# Patient Record
Sex: FEMALE | Race: BLACK OR AFRICAN AMERICAN | NOT HISPANIC OR LATINO | Employment: UNEMPLOYED | URBAN - METROPOLITAN AREA
[De-identification: names, ages, dates, MRNs, and addresses within clinical notes are randomized per-mention and may not be internally consistent; named-entity substitution may affect disease eponyms.]

---

## 2023-12-31 ENCOUNTER — APPOINTMENT (OUTPATIENT)
Dept: RADIOLOGY | Facility: HOSPITAL | Age: 43
End: 2023-12-31
Payer: COMMERCIAL

## 2023-12-31 ENCOUNTER — HOSPITAL ENCOUNTER (OUTPATIENT)
Facility: HOSPITAL | Age: 43
Setting detail: OBSERVATION
Discharge: HOME | End: 2024-01-01
Attending: EMERGENCY MEDICINE | Admitting: INTERNAL MEDICINE
Payer: COMMERCIAL

## 2023-12-31 ENCOUNTER — APPOINTMENT (OUTPATIENT)
Dept: CARDIOLOGY | Facility: HOSPITAL | Age: 43
End: 2023-12-31
Payer: COMMERCIAL

## 2023-12-31 DIAGNOSIS — E86.0 DEHYDRATION: ICD-10-CM

## 2023-12-31 DIAGNOSIS — E11.00 TYPE 2 DIABETES MELLITUS WITH HYPEROSMOLAR NONKETOTIC HYPERGLYCEMIA (MULTI): Primary | ICD-10-CM

## 2023-12-31 LAB
ALBUMIN SERPL BCP-MCNC: 3.7 G/DL (ref 3.4–5)
ALP SERPL-CCNC: 94 U/L (ref 33–110)
ALT SERPL W P-5'-P-CCNC: 10 U/L (ref 7–45)
ANION GAP BLDV CALCULATED.4IONS-SCNC: 8 MMOL/L (ref 10–25)
ANION GAP SERPL CALC-SCNC: 15 MMOL/L (ref 10–20)
AST SERPL W P-5'-P-CCNC: 11 U/L (ref 9–39)
B-HCG SERPL-ACNC: <2 MIU/ML
BASE EXCESS BLDV CALC-SCNC: 4.8 MMOL/L (ref -2–3)
BASOPHILS # BLD AUTO: 0.03 X10*3/UL (ref 0–0.1)
BASOPHILS NFR BLD AUTO: 0.3 %
BILIRUB SERPL-MCNC: 0.4 MG/DL (ref 0–1.2)
BNP SERPL-MCNC: 45 PG/ML (ref 0–99)
BODY TEMPERATURE: 37 DEGREES CELSIUS
BUN SERPL-MCNC: 12 MG/DL (ref 6–23)
CA-I BLDV-SCNC: 1.21 MMOL/L (ref 1.1–1.33)
CALCIUM SERPL-MCNC: 9.2 MG/DL (ref 8.6–10.3)
CARDIAC TROPONIN I PNL SERPL HS: 15 NG/L (ref 0–13)
CHLORIDE BLDV-SCNC: 94 MMOL/L (ref 98–107)
CHLORIDE SERPL-SCNC: 92 MMOL/L (ref 98–107)
CO2 SERPL-SCNC: 26 MMOL/L (ref 21–32)
CREAT SERPL-MCNC: 0.81 MG/DL (ref 0.5–1.05)
D DIMER PPP FEU-MCNC: 224 NG/ML FEU
EOSINOPHIL # BLD AUTO: 0.09 X10*3/UL (ref 0–0.7)
EOSINOPHIL NFR BLD AUTO: 1 %
ERYTHROCYTE [DISTWIDTH] IN BLOOD BY AUTOMATED COUNT: 13.7 % (ref 11.5–14.5)
EST. AVERAGE GLUCOSE BLD GHB EST-MCNC: 364 MG/DL
ETHANOL SERPL-MCNC: <10 MG/DL
FLUAV RNA RESP QL NAA+PROBE: NOT DETECTED
FLUBV RNA RESP QL NAA+PROBE: NOT DETECTED
GFR SERPL CREATININE-BSD FRML MDRD: >90 ML/MIN/1.73M*2
GLUCOSE BLD MANUAL STRIP-MCNC: 261 MG/DL (ref 74–99)
GLUCOSE BLD MANUAL STRIP-MCNC: 285 MG/DL (ref 74–99)
GLUCOSE BLD MANUAL STRIP-MCNC: 306 MG/DL (ref 74–99)
GLUCOSE BLDV-MCNC: 554 MG/DL (ref 74–99)
GLUCOSE SERPL-MCNC: 485 MG/DL (ref 74–99)
HBA1C MFR BLD: 14.3 %
HCO3 BLDV-SCNC: 29.9 MMOL/L (ref 22–26)
HCT VFR BLD AUTO: 42.1 % (ref 36–46)
HCT VFR BLD EST: 38 % (ref 36–46)
HGB BLD-MCNC: 13 G/DL (ref 12–16)
HGB BLDV-MCNC: 12.7 G/DL (ref 12–16)
IMM GRANULOCYTES # BLD AUTO: 0.02 X10*3/UL (ref 0–0.7)
IMM GRANULOCYTES NFR BLD AUTO: 0.2 % (ref 0–0.9)
INHALED O2 CONCENTRATION: 21 %
LACTATE BLDV-SCNC: 3.2 MMOL/L (ref 0.4–2)
LACTATE BLDV-SCNC: 3.6 MMOL/L (ref 0.4–2)
LYMPHOCYTES # BLD AUTO: 3.32 X10*3/UL (ref 1.2–4.8)
LYMPHOCYTES NFR BLD AUTO: 38.6 %
MCH RBC QN AUTO: 23 PG (ref 26–34)
MCHC RBC AUTO-ENTMCNC: 30.9 G/DL (ref 32–36)
MCV RBC AUTO: 75 FL (ref 80–100)
MONOCYTES # BLD AUTO: 0.43 X10*3/UL (ref 0.1–1)
MONOCYTES NFR BLD AUTO: 5 %
NEUTROPHILS # BLD AUTO: 4.71 X10*3/UL (ref 1.2–7.7)
NEUTROPHILS NFR BLD AUTO: 54.9 %
NRBC BLD-RTO: 0 /100 WBCS (ref 0–0)
OSMOLALITY SERPL: 302 MOSM/KG (ref 280–300)
OXYHGB MFR BLDV: 87 % (ref 45–75)
PCO2 BLDV: 45 MM HG (ref 41–51)
PH BLDV: 7.43 PH (ref 7.33–7.43)
PLATELET # BLD AUTO: 236 X10*3/UL (ref 150–450)
PO2 BLDV: 58 MM HG (ref 35–45)
POTASSIUM BLDV-SCNC: 4.4 MMOL/L (ref 3.5–5.3)
POTASSIUM SERPL-SCNC: 4.5 MMOL/L (ref 3.5–5.3)
PROT SERPL-MCNC: 7 G/DL (ref 6.4–8.2)
RBC # BLD AUTO: 5.64 X10*6/UL (ref 4–5.2)
SAO2 % BLDV: 89 % (ref 45–75)
SARS-COV-2 RNA RESP QL NAA+PROBE: NOT DETECTED
SODIUM BLDV-SCNC: 127 MMOL/L (ref 136–145)
SODIUM SERPL-SCNC: 128 MMOL/L (ref 136–145)
WBC # BLD AUTO: 8.6 X10*3/UL (ref 4.4–11.3)

## 2023-12-31 PROCEDURE — 2500000004 HC RX 250 GENERAL PHARMACY W/ HCPCS (ALT 636 FOR OP/ED): Performed by: EMERGENCY MEDICINE

## 2023-12-31 PROCEDURE — 84484 ASSAY OF TROPONIN QUANT: CPT | Performed by: EMERGENCY MEDICINE

## 2023-12-31 PROCEDURE — 83880 ASSAY OF NATRIURETIC PEPTIDE: CPT | Performed by: EMERGENCY MEDICINE

## 2023-12-31 PROCEDURE — 84132 ASSAY OF SERUM POTASSIUM: CPT | Performed by: EMERGENCY MEDICINE

## 2023-12-31 PROCEDURE — 85379 FIBRIN DEGRADATION QUANT: CPT | Performed by: EMERGENCY MEDICINE

## 2023-12-31 PROCEDURE — 83036 HEMOGLOBIN GLYCOSYLATED A1C: CPT | Mod: AHULAB | Performed by: NURSE PRACTITIONER

## 2023-12-31 PROCEDURE — 87636 SARSCOV2 & INF A&B AMP PRB: CPT | Performed by: EMERGENCY MEDICINE

## 2023-12-31 PROCEDURE — 36415 COLL VENOUS BLD VENIPUNCTURE: CPT | Performed by: EMERGENCY MEDICINE

## 2023-12-31 PROCEDURE — 85025 COMPLETE CBC W/AUTO DIFF WBC: CPT | Performed by: EMERGENCY MEDICINE

## 2023-12-31 PROCEDURE — 99222 1ST HOSP IP/OBS MODERATE 55: CPT | Performed by: NURSE PRACTITIONER

## 2023-12-31 PROCEDURE — 84702 CHORIONIC GONADOTROPIN TEST: CPT | Performed by: EMERGENCY MEDICINE

## 2023-12-31 PROCEDURE — 96375 TX/PRO/DX INJ NEW DRUG ADDON: CPT

## 2023-12-31 PROCEDURE — 2500000001 HC RX 250 WO HCPCS SELF ADMINISTERED DRUGS (ALT 637 FOR MEDICARE OP): Performed by: NURSE PRACTITIONER

## 2023-12-31 PROCEDURE — G0378 HOSPITAL OBSERVATION PER HR: HCPCS

## 2023-12-31 PROCEDURE — 2500000001 HC RX 250 WO HCPCS SELF ADMINISTERED DRUGS (ALT 637 FOR MEDICARE OP): Performed by: EMERGENCY MEDICINE

## 2023-12-31 PROCEDURE — 83930 ASSAY OF BLOOD OSMOLALITY: CPT | Mod: AHULAB | Performed by: NURSE PRACTITIONER

## 2023-12-31 PROCEDURE — 70450 CT HEAD/BRAIN W/O DYE: CPT | Performed by: STUDENT IN AN ORGANIZED HEALTH CARE EDUCATION/TRAINING PROGRAM

## 2023-12-31 PROCEDURE — 99285 EMERGENCY DEPT VISIT HI MDM: CPT | Mod: 25 | Performed by: EMERGENCY MEDICINE

## 2023-12-31 PROCEDURE — 99222 1ST HOSP IP/OBS MODERATE 55: CPT | Performed by: INTERNAL MEDICINE

## 2023-12-31 PROCEDURE — 96372 THER/PROPH/DIAG INJ SC/IM: CPT | Performed by: NURSE PRACTITIONER

## 2023-12-31 PROCEDURE — 93005 ELECTROCARDIOGRAM TRACING: CPT

## 2023-12-31 PROCEDURE — 2500000004 HC RX 250 GENERAL PHARMACY W/ HCPCS (ALT 636 FOR OP/ED): Performed by: NURSE PRACTITIONER

## 2023-12-31 PROCEDURE — 82947 ASSAY GLUCOSE BLOOD QUANT: CPT | Mod: 91

## 2023-12-31 PROCEDURE — 82077 ASSAY SPEC XCP UR&BREATH IA: CPT | Performed by: EMERGENCY MEDICINE

## 2023-12-31 PROCEDURE — 82330 ASSAY OF CALCIUM: CPT | Performed by: EMERGENCY MEDICINE

## 2023-12-31 PROCEDURE — 96361 HYDRATE IV INFUSION ADD-ON: CPT

## 2023-12-31 PROCEDURE — 2500000002 HC RX 250 W HCPCS SELF ADMINISTERED DRUGS (ALT 637 FOR MEDICARE OP, ALT 636 FOR OP/ED): Performed by: NURSE PRACTITIONER

## 2023-12-31 PROCEDURE — 83605 ASSAY OF LACTIC ACID: CPT | Mod: 91 | Performed by: EMERGENCY MEDICINE

## 2023-12-31 PROCEDURE — 80053 COMPREHEN METABOLIC PANEL: CPT | Performed by: EMERGENCY MEDICINE

## 2023-12-31 PROCEDURE — 96374 THER/PROPH/DIAG INJ IV PUSH: CPT

## 2023-12-31 PROCEDURE — 70450 CT HEAD/BRAIN W/O DYE: CPT

## 2023-12-31 PROCEDURE — 2500000002 HC RX 250 W HCPCS SELF ADMINISTERED DRUGS (ALT 637 FOR MEDICARE OP, ALT 636 FOR OP/ED): Performed by: EMERGENCY MEDICINE

## 2023-12-31 RX ORDER — ATORVASTATIN CALCIUM 80 MG/1
80 TABLET, FILM COATED ORAL DAILY
COMMUNITY

## 2023-12-31 RX ORDER — SPIRONOLACTONE 25 MG/1
25 TABLET ORAL DAILY
COMMUNITY

## 2023-12-31 RX ORDER — ATORVASTATIN CALCIUM 80 MG/1
80 TABLET, FILM COATED ORAL NIGHTLY
Status: DISCONTINUED | OUTPATIENT
Start: 2023-12-31 | End: 2024-01-01 | Stop reason: HOSPADM

## 2023-12-31 RX ORDER — CARVEDILOL 25 MG/1
25 TABLET ORAL
Status: DISCONTINUED | OUTPATIENT
Start: 2023-12-31 | End: 2024-01-01 | Stop reason: HOSPADM

## 2023-12-31 RX ORDER — TORSEMIDE 20 MG/1
40 TABLET ORAL DAILY
Status: DISCONTINUED | OUTPATIENT
Start: 2023-12-31 | End: 2024-01-01 | Stop reason: HOSPADM

## 2023-12-31 RX ORDER — ASCORBIC ACID 500 MG
250 TABLET ORAL DAILY
COMMUNITY

## 2023-12-31 RX ORDER — ONDANSETRON HYDROCHLORIDE 2 MG/ML
4 INJECTION, SOLUTION INTRAVENOUS EVERY 8 HOURS PRN
Status: DISCONTINUED | OUTPATIENT
Start: 2023-12-31 | End: 2024-01-01 | Stop reason: HOSPADM

## 2023-12-31 RX ORDER — INSULIN LISPRO 100 [IU]/ML
0-10 INJECTION, SOLUTION INTRAVENOUS; SUBCUTANEOUS
Status: DISCONTINUED | OUTPATIENT
Start: 2023-12-31 | End: 2024-01-01 | Stop reason: HOSPADM

## 2023-12-31 RX ORDER — INSULIN GLARGINE 100 [IU]/ML
15 INJECTION, SOLUTION SUBCUTANEOUS NIGHTLY
Status: DISCONTINUED | OUTPATIENT
Start: 2023-12-31 | End: 2024-01-01 | Stop reason: HOSPADM

## 2023-12-31 RX ORDER — EZETIMIBE 10 MG/1
10 TABLET ORAL NIGHTLY
Status: DISCONTINUED | OUTPATIENT
Start: 2023-12-31 | End: 2024-01-01 | Stop reason: HOSPADM

## 2023-12-31 RX ORDER — TORSEMIDE 20 MG/1
40 TABLET ORAL DAILY
COMMUNITY

## 2023-12-31 RX ORDER — METOCLOPRAMIDE HYDROCHLORIDE 5 MG/ML
10 INJECTION INTRAMUSCULAR; INTRAVENOUS ONCE
Status: COMPLETED | OUTPATIENT
Start: 2023-12-31 | End: 2023-12-31

## 2023-12-31 RX ORDER — ONDANSETRON 4 MG/1
4 TABLET, FILM COATED ORAL EVERY 6 HOURS PRN
Status: DISCONTINUED | OUTPATIENT
Start: 2023-12-31 | End: 2023-12-31

## 2023-12-31 RX ORDER — FERROUS SULFATE 325(65) MG
65 TABLET, DELAYED RELEASE (ENTERIC COATED) ORAL
COMMUNITY

## 2023-12-31 RX ORDER — ACETAMINOPHEN 160 MG/5ML
650 SOLUTION ORAL EVERY 4 HOURS PRN
Status: DISCONTINUED | OUTPATIENT
Start: 2023-12-31 | End: 2024-01-01 | Stop reason: HOSPADM

## 2023-12-31 RX ORDER — GLIPIZIDE 5 MG/1
10 TABLET ORAL
Status: DISCONTINUED | OUTPATIENT
Start: 2023-12-31 | End: 2024-01-01 | Stop reason: HOSPADM

## 2023-12-31 RX ORDER — DEXTROSE 50 % IN WATER (D50W) INTRAVENOUS SYRINGE
25
Status: DISCONTINUED | OUTPATIENT
Start: 2023-12-31 | End: 2024-01-01 | Stop reason: HOSPADM

## 2023-12-31 RX ORDER — DEXTROSE MONOHYDRATE 100 MG/ML
0.3 INJECTION, SOLUTION INTRAVENOUS ONCE AS NEEDED
Status: DISCONTINUED | OUTPATIENT
Start: 2023-12-31 | End: 2024-01-01 | Stop reason: HOSPADM

## 2023-12-31 RX ORDER — CARVEDILOL 25 MG/1
25 TABLET ORAL
COMMUNITY

## 2023-12-31 RX ORDER — NAPROXEN SODIUM 220 MG/1
324 TABLET, FILM COATED ORAL ONCE
Status: COMPLETED | OUTPATIENT
Start: 2023-12-31 | End: 2023-12-31

## 2023-12-31 RX ORDER — INSULIN GLARGINE 100 [IU]/ML
15 INJECTION, SOLUTION SUBCUTANEOUS NIGHTLY
COMMUNITY

## 2023-12-31 RX ORDER — DIPHENHYDRAMINE HYDROCHLORIDE 50 MG/ML
50 INJECTION INTRAMUSCULAR; INTRAVENOUS ONCE
Status: COMPLETED | OUTPATIENT
Start: 2023-12-31 | End: 2023-12-31

## 2023-12-31 RX ORDER — ASCORBIC ACID 500 MG
250 TABLET ORAL DAILY
Status: DISCONTINUED | OUTPATIENT
Start: 2023-12-31 | End: 2024-01-01 | Stop reason: HOSPADM

## 2023-12-31 RX ORDER — EZETIMIBE 10 MG/1
10 TABLET ORAL DAILY
COMMUNITY

## 2023-12-31 RX ORDER — ENOXAPARIN SODIUM 100 MG/ML
40 INJECTION SUBCUTANEOUS EVERY 12 HOURS SCHEDULED
Status: DISCONTINUED | OUTPATIENT
Start: 2023-12-31 | End: 2024-01-01 | Stop reason: HOSPADM

## 2023-12-31 RX ORDER — SODIUM CHLORIDE 9 MG/ML
100 INJECTION, SOLUTION INTRAVENOUS CONTINUOUS
Status: DISCONTINUED | OUTPATIENT
Start: 2023-12-31 | End: 2023-12-31

## 2023-12-31 RX ORDER — ACETAMINOPHEN 325 MG/1
650 TABLET ORAL EVERY 4 HOURS PRN
Status: DISCONTINUED | OUTPATIENT
Start: 2023-12-31 | End: 2024-01-01 | Stop reason: HOSPADM

## 2023-12-31 RX ORDER — TALC
3 POWDER (GRAM) TOPICAL NIGHTLY
Status: DISCONTINUED | OUTPATIENT
Start: 2023-12-31 | End: 2024-01-01 | Stop reason: HOSPADM

## 2023-12-31 RX ORDER — ONDANSETRON 4 MG/1
4 TABLET, FILM COATED ORAL EVERY 6 HOURS PRN
COMMUNITY

## 2023-12-31 RX ORDER — SPIRONOLACTONE 25 MG/1
25 TABLET ORAL DAILY
Status: DISCONTINUED | OUTPATIENT
Start: 2023-12-31 | End: 2024-01-01 | Stop reason: HOSPADM

## 2023-12-31 RX ORDER — ALBUTEROL SULFATE 0.83 MG/ML
2.5 SOLUTION RESPIRATORY (INHALATION) EVERY 6 HOURS PRN
Status: DISCONTINUED | OUTPATIENT
Start: 2023-12-31 | End: 2024-01-01 | Stop reason: HOSPADM

## 2023-12-31 RX ORDER — ACETAMINOPHEN 650 MG/1
650 SUPPOSITORY RECTAL EVERY 4 HOURS PRN
Status: DISCONTINUED | OUTPATIENT
Start: 2023-12-31 | End: 2024-01-01 | Stop reason: HOSPADM

## 2023-12-31 RX ORDER — ONDANSETRON 4 MG/1
4 TABLET, FILM COATED ORAL EVERY 8 HOURS PRN
Status: DISCONTINUED | OUTPATIENT
Start: 2023-12-31 | End: 2024-01-01 | Stop reason: HOSPADM

## 2023-12-31 RX ORDER — FERROUS SULFATE 325(65) MG
65 TABLET ORAL DAILY
Status: DISCONTINUED | OUTPATIENT
Start: 2023-12-31 | End: 2024-01-01 | Stop reason: HOSPADM

## 2023-12-31 RX ORDER — POLYETHYLENE GLYCOL 3350 17 G/17G
17 POWDER, FOR SOLUTION ORAL DAILY
Status: DISCONTINUED | OUTPATIENT
Start: 2023-12-31 | End: 2024-01-01 | Stop reason: HOSPADM

## 2023-12-31 RX ORDER — GLIPIZIDE 10 MG/1
10 TABLET ORAL DAILY
COMMUNITY

## 2023-12-31 RX ORDER — SACUBITRIL AND VALSARTAN 49; 51 MG/1; MG/1
1 TABLET, FILM COATED ORAL 2 TIMES DAILY
COMMUNITY

## 2023-12-31 RX ADMIN — ATORVASTATIN CALCIUM 80 MG: 80 TABLET, FILM COATED ORAL at 21:03

## 2023-12-31 RX ADMIN — EZETIMIBE 10 MG: 10 TABLET ORAL at 21:03

## 2023-12-31 RX ADMIN — SODIUM CHLORIDE 1000 ML: 9 INJECTION, SOLUTION INTRAVENOUS at 14:20

## 2023-12-31 RX ADMIN — INSULIN LISPRO 6 UNITS: 100 INJECTION, SOLUTION INTRAVENOUS; SUBCUTANEOUS at 18:32

## 2023-12-31 RX ADMIN — FERROUS SULFATE TAB 325 MG (65 MG ELEMENTAL FE) 1 TABLET: 325 (65 FE) TAB at 18:34

## 2023-12-31 RX ADMIN — INSULIN GLARGINE 15 UNITS: 100 INJECTION, SOLUTION SUBCUTANEOUS at 21:11

## 2023-12-31 RX ADMIN — Medication 3 MG: at 21:03

## 2023-12-31 RX ADMIN — OXYCODONE HYDROCHLORIDE AND ACETAMINOPHEN 250 MG: 500 TABLET ORAL at 18:34

## 2023-12-31 RX ADMIN — ASPIRIN 81 MG CHEWABLE TABLET 324 MG: 81 TABLET CHEWABLE at 13:48

## 2023-12-31 RX ADMIN — INSULIN HUMAN 12 UNITS: 100 INJECTION, SOLUTION PARENTERAL at 15:08

## 2023-12-31 RX ADMIN — GLIPIZIDE 10 MG: 5 TABLET ORAL at 18:34

## 2023-12-31 RX ADMIN — ACETAMINOPHEN 650 MG: 325 TABLET ORAL at 21:03

## 2023-12-31 RX ADMIN — DIPHENHYDRAMINE HYDROCHLORIDE 50 MG: 50 INJECTION, SOLUTION INTRAMUSCULAR; INTRAVENOUS at 13:48

## 2023-12-31 RX ADMIN — ENOXAPARIN SODIUM 40 MG: 40 INJECTION SUBCUTANEOUS at 21:03

## 2023-12-31 RX ADMIN — METOCLOPRAMIDE 10 MG: 5 INJECTION, SOLUTION INTRAMUSCULAR; INTRAVENOUS at 13:48

## 2023-12-31 RX ADMIN — CARVEDILOL 25 MG: 25 TABLET, FILM COATED ORAL at 18:34

## 2023-12-31 RX ADMIN — SACUBITRIL AND VALSARTAN 1 TABLET: 49; 51 TABLET, FILM COATED ORAL at 21:03

## 2023-12-31 SDOH — SOCIAL STABILITY: SOCIAL INSECURITY: HAVE YOU HAD THOUGHTS OF HARMING ANYONE ELSE?: NO

## 2023-12-31 SDOH — SOCIAL STABILITY: SOCIAL INSECURITY: DOES ANYONE TRY TO KEEP YOU FROM HAVING/CONTACTING OTHER FRIENDS OR DOING THINGS OUTSIDE YOUR HOME?: NO

## 2023-12-31 SDOH — SOCIAL STABILITY: SOCIAL INSECURITY: HAS ANYONE EVER THREATENED TO HURT YOUR FAMILY OR YOUR PETS?: NO

## 2023-12-31 SDOH — SOCIAL STABILITY: SOCIAL INSECURITY: DO YOU FEEL ANYONE HAS EXPLOITED OR TAKEN ADVANTAGE OF YOU FINANCIALLY OR OF YOUR PERSONAL PROPERTY?: NO

## 2023-12-31 SDOH — SOCIAL STABILITY: SOCIAL INSECURITY: ABUSE: ADULT

## 2023-12-31 SDOH — SOCIAL STABILITY: SOCIAL INSECURITY: ARE THERE ANY APPARENT SIGNS OF INJURIES/BEHAVIORS THAT COULD BE RELATED TO ABUSE/NEGLECT?: NO

## 2023-12-31 SDOH — SOCIAL STABILITY: SOCIAL INSECURITY: DO YOU FEEL UNSAFE GOING BACK TO THE PLACE WHERE YOU ARE LIVING?: NO

## 2023-12-31 SDOH — SOCIAL STABILITY: SOCIAL INSECURITY: ARE YOU OR HAVE YOU BEEN THREATENED OR ABUSED PHYSICALLY, EMOTIONALLY, OR SEXUALLY BY ANYONE?: NO

## 2023-12-31 SDOH — SOCIAL STABILITY: SOCIAL INSECURITY: WERE YOU ABLE TO COMPLETE ALL THE BEHAVIORAL HEALTH SCREENINGS?: YES

## 2023-12-31 ASSESSMENT — PAIN SCALES - GENERAL
PAINLEVEL_OUTOF10: 6
PAINLEVEL_OUTOF10: 2
PAINLEVEL_OUTOF10: 3
PAINLEVEL_OUTOF10: 0 - NO PAIN

## 2023-12-31 ASSESSMENT — LIFESTYLE VARIABLES
SUBSTANCE_ABUSE_PAST_12_MONTHS: YES
HOW OFTEN DO YOU HAVE 6 OR MORE DRINKS ON ONE OCCASION: NEVER
HOW OFTEN DO YOU HAVE A DRINK CONTAINING ALCOHOL: NEVER
AUDIT-C TOTAL SCORE: 0
PRESCIPTION_ABUSE_PAST_12_MONTHS: NO
HOW MANY STANDARD DRINKS CONTAINING ALCOHOL DO YOU HAVE ON A TYPICAL DAY: PATIENT DOES NOT DRINK
AUDIT-C TOTAL SCORE: 0
SKIP TO QUESTIONS 9-10: 1

## 2023-12-31 ASSESSMENT — ACTIVITIES OF DAILY LIVING (ADL)
PATIENT'S MEMORY ADEQUATE TO SAFELY COMPLETE DAILY ACTIVITIES?: YES
HEARING - LEFT EAR: FUNCTIONAL
LACK_OF_TRANSPORTATION: NO
BATHING: INDEPENDENT
DRESSING YOURSELF: INDEPENDENT
TOILETING: INDEPENDENT
FEEDING YOURSELF: INDEPENDENT
HEARING - RIGHT EAR: FUNCTIONAL
GROOMING: INDEPENDENT
WALKS IN HOME: INDEPENDENT
JUDGMENT_ADEQUATE_SAFELY_COMPLETE_DAILY_ACTIVITIES: YES
ADEQUATE_TO_COMPLETE_ADL: YES
ASSISTIVE_DEVICE: EYEGLASSES

## 2023-12-31 ASSESSMENT — PATIENT HEALTH QUESTIONNAIRE - PHQ9
1. LITTLE INTEREST OR PLEASURE IN DOING THINGS: NOT AT ALL
2. FEELING DOWN, DEPRESSED OR HOPELESS: NOT AT ALL
SUM OF ALL RESPONSES TO PHQ9 QUESTIONS 1 & 2: 0

## 2023-12-31 ASSESSMENT — PAIN DESCRIPTION - DESCRIPTORS
DESCRIPTORS: PRESSURE
DESCRIPTORS: HEADACHE

## 2023-12-31 ASSESSMENT — PAIN - FUNCTIONAL ASSESSMENT
PAIN_FUNCTIONAL_ASSESSMENT: 0-10

## 2023-12-31 ASSESSMENT — COGNITIVE AND FUNCTIONAL STATUS - GENERAL
DAILY ACTIVITIY SCORE: 24
MOBILITY SCORE: 24
PATIENT BASELINE BEDBOUND: NO

## 2023-12-31 ASSESSMENT — COLUMBIA-SUICIDE SEVERITY RATING SCALE - C-SSRS
6. HAVE YOU EVER DONE ANYTHING, STARTED TO DO ANYTHING, OR PREPARED TO DO ANYTHING TO END YOUR LIFE?: NO
1. IN THE PAST MONTH, HAVE YOU WISHED YOU WERE DEAD OR WISHED YOU COULD GO TO SLEEP AND NOT WAKE UP?: NO
2. HAVE YOU ACTUALLY HAD ANY THOUGHTS OF KILLING YOURSELF?: NO

## 2023-12-31 ASSESSMENT — PAIN DESCRIPTION - LOCATION: LOCATION: CHEST

## 2023-12-31 NOTE — CONSULTS
"Inpatient consult to Cardiology  Consult performed by: Raz Guzmán DO  Consult ordered by: MIMA Jauregui-CNP  Reason for consult: NICM, chest pain with shock like feeling        History Of Present Illness:    Jose Abreu is a 43 y.o. female with nonischemic CM, s/p sub q ICD 2021(yetu), HTN, DM, HPL, morbid obesity admitted yesterday with exertional dyspnea and chest pain that described as shock like sensation.  This has been going on for the last 4 to 5 days.  Additional symptoms include palpitations/heart racing sensation, headaches, nausea without vomiting.  Found to be severely hyperglycemic on arrival with BS >500 and acidotic with lactate 3.2.  Seen at [facility] the day prior with similar complaints hyperglycemic \"blood sugar in the 400s.  She was given insulin and sent home.  Presented  for another opinion.    She reports compliance with her heart failure medications including Entresto, carvedilol, spironolactone.  Appears she could not tolerate SGLT2 inhibitors due to yeast infections.    This morning says she feels much better.  No further chest pain.  Nausea and headaches have also resolved.    Past Cardiology Tests   EK23, NSR with HR 99 bpm, LA enlargement, LVH with repolarization, no significant changes from ECG in     Echo:  22 ([facility]):  - The left ventricle is severely dilated. Left ventricular systolic function is   moderately decreased. EF = 40 ± 5% (2D biplane) Grade II left ventricular   diastolic dysfunction.   - The right ventricle is normal in size. Right ventricular systolic function is   normal.   - The left atrial cavity is mildly dilated.   - There is mild (1+) aortic valve regurgitation.   - Exam was compared with the prior  echocardiographic exam performed on   2021. LVEF has improved.     2021 severe LV systolic dysfunction, EF 22%. Normal RV systolic function. Mild mitral regurgitation. Mild to moderate tricuspid regurgitation. " "    2018: EF 40-45%, stage II DD, mild LVH    Cath:  Cardiac cath 10/2019 [facility] in [place] severely elevated LVEDP 42 mm Hg. EF 40%.       Past Medical History:  She has a past medical history of CHF (congestive heart failure) (CMS/[facility]), COPD (chronic obstructive pulmonary disease) (CMS/[facility]), and Diabetes mellitus, type 2 (CMS/[facility]).    Past Surgical History:  She has a past surgical history that includes Cardiac defibrillator placement.      Social History:  She reports that she has never smoked. She has never used smokeless tobacco. She reports current drug use. Drug: Marijuana. No history on file for alcohol use.    Family History:  No family history on file.     Allergies:  Lisinopril and Tomato    ROS:  10 point review of systems including (Constitutional, Eyes, ENMT, Respiratory, Cardiac, Gastrointestinal, Neurological, Psychiatric, and Hematologic) was performed and is otherwise negative.    Objective Data:  Last Recorded Vitals:  Vitals:    23 1246 23 1525 23 1713 23 1815   BP: 127/68 122/82 139/87 114/68   BP Location:    Right arm   Patient Position:    Lying   Pulse: 102 98 91 88   Resp: 22 16 16 16   Temp: 36.2 °C (97.1 °F)   35.9 °C (96.6 °F)   TempSrc: Temporal   Temporal   SpO2: 98% 99% 99% 99%   Weight: 120 kg (265 lb)   120 kg (265 lb)   Height: 1.626 m (5' 4\")   1.626 m (5' 4\")     Medical Gas Therapy: None (Room air)  Weight  Av kg (265 lb)  Min: 120 kg (265 lb)  Max: 120 kg (265 lb)      LABS:  CMP:  Results from last 7 days   Lab Units 23  1249   SODIUM mmol/L 128*   POTASSIUM mmol/L 4.5   CHLORIDE mmol/L 92*   CO2 mmol/L 26   ANION GAP mmol/L 15   BUN mg/dL 12   CREATININE mg/dL 0.81   EGFR mL/min/1.73m*2 >90   ALBUMIN g/dL 3.7   ALT U/L 10   AST U/L 11   BILIRUBIN TOTAL mg/dL 0.4     CBC:  Results from last 7 days   Lab Units 12/31/23  1249   WBC AUTO x10*3/uL 8.6   HEMOGLOBIN g/dL 13.0   HEMATOCRIT % 42.1   PLATELETS AUTO x10*3/uL 236   MCV " "fL 75*     COAG:     ABO: No results found for: \"ABO\"  HEME/ENDO:     CARDIAC:   Results from last 7 days   Lab Units 12/31/23  1341 12/31/23  1249   TROPHS ng/L  --  15*   BNP pg/mL 45  --            Component      Latest Ref Rng 12/31/2023   Hemoglobin A1C      see below % 14.3 (H)    Estimated Average Glucose      Not Established mg/dL 364       Legend:  (H) High    Last I/O:  No intake or output data in the 24 hours ending 12/31/23 1831  Net IO Since Admission: No IO data has been entered for this period [12/31/23 1831]      Imaging Results:  CT head wo IV contrast    Result Date: 12/31/2023  Interpreted By:  Reg Miller, STUDY: CT HEAD WO IV CONTRAST;  12/31/2023 5:06 pm   INDICATION: Signs/Symptoms:headache for 21 days.   COMPARISON: 06/21/2021 CT head.   ACCESSION NUMBER(S): [id number]   ORDERING CLINICIAN: ELISE CAPONE   TECHNIQUE: Noncontrast axial CT images of head were obtained with coronal and sagittal reconstructed images.   FINDINGS: BRAIN PARENCHYMA:  No acute intraparenchymal hemorrhage or parenchymal evidence of acute large territory ischemic infarct. No mass-effect. Gray-white matter distinction is preserved.   VENTRICLES and EXTRA-AXIAL SPACES:  No acute extra-axial or intraventricular hemorrhage. No effacement of cerebral sulci. Ventricles and sulci are age-concordant.   PARANASAL SINUSES/MASTOIDS:  No hemorrhage or air-fluid levels within the visualized paranasal sinuses. The mastoids are well aerated.   CALVARIUM/ORBITS:  No skull fracture.  The orbits and globes are intact to the extent visualized.   EXTRACRANIAL SOFT TISSUES: No discernible abnormality.       1. No acute intracranial abnormality.         MACRO: None.   Signed by: Reg Miller 12/31/2023 5:23 PM Dictation workstation:   [id number]      Inpatient Medications:  Scheduled medications   Medication Dose Route Frequency    ascorbic acid  250 mg oral Daily    atorvastatin  80 mg oral Nightly    carvedilol  25 mg oral " BID with meals    enoxaparin  40 mg subcutaneous q12h MOISES    ezetimibe  10 mg oral Nightly    ferrous sulfate (325 mg ferrous sulfate)  65 mg of iron oral Daily    glipiZIDE  10 mg oral Daily with evening meal    insulin glargine  15 Units subcutaneous Nightly    insulin lispro  0-10 Units subcutaneous TID with meals    melatonin  3 mg oral Nightly    polyethylene glycol  17 g oral Daily    sacubitriL-valsartan  1 tablet oral BID    [Held by provider] spironolactone  25 mg oral Daily    [Held by provider] torsemide  40 mg oral Daily     PRN medications   Medication    acetaminophen    Or    acetaminophen    Or    acetaminophen    albuterol    dextrose 10 % in water (D10W)    dextrose    glucagon    ondansetron    Or    ondansetron     Continuous Medications   Medication Dose Last Rate       Outpatient Medications:  Current Outpatient Medications   Medication Instructions    albuterol sulfate (Proair Digihaler) 90 mcg/actuation aero powdr breath act w/sensor inhaler 2 puffs, inhalation, Every 4 hours PRN    ascorbic acid (VITAMIN C) 250 mg, oral, Daily    atorvastatin (LIPITOR) 80 mg, oral, Daily    carvedilol (COREG) 25 mg, oral, 2 times daily with meals    ezetimibe (ZETIA) 10 mg, oral, Daily    ferrous sulfate 65 mg, oral, 3 times daily with meals, Do not crush, chew, or split.    glipiZIDE (GLUCOTROL) 10 mg, oral, Daily    insulin glargine (LANTUS U-100 INSULIN) 15 Units, subcutaneous, Nightly, Take as directed per insulin instructions.    ondansetron (ZOFRAN) 4 mg, oral, Every 6 hours PRN    sacubitriL-valsartan (Entresto) 49-51 mg tablet 1 tablet, oral, 2 times daily    spironolactone (ALDACTONE) 25 mg, oral, Daily    torsemide (DEMADEX) 40 mg, oral, Daily       Physical Exam:  GENERAL: alert, cooperative, pleasant, in no acute distress  SKIN: warm, dry, no rash.  NECK: no JVD, no ZHANE  CARDIAC: Regular rate and rhythm with no rubs, murmurs, or gallops  CHEST: Normal respiratory efforts, lungs clear to  auscultation bilaterally.  ABDOMEN: soft, nontender, nondistended  EXTREMITIES: no edema  NEURO: Alert and oriented x 3.  Grossly normal.  Moves all 4 extremities.       Assessment/Plan   43 y.o. female with nonischemic CM, s/p sub q ICD 12/2021([place]), HTN, DM, HPL, obesity admitted yesterday with exertional dyspnea and chest pain that described as shock like sensation.  This has been going on for the last 4 to 5 days.  Additional symptoms include palpitations/heart racing sensation, headaches, nausea without vomiting.     Presentation consistent with DKA, poorly controlled blood sugars.  Hemoglobin A1c 14.3%.  She is now chest pain-free and has ruled out for MI with serial troponin.    CHF with chronic systolic dysfunction seems stable.  Continue current medications including carvedilol, Entresto.  Okay to resume spironolactone and torsemide.  Diabetes control per endocrine    No further cardiac testing required at this time  Stable for DC from cardiac standpoint  Please contact us back if can be of any further assistance.        Raz Guzmán, DO

## 2023-12-31 NOTE — ED PROVIDER NOTES
HPI   Chief Complaint   Patient presents with    Chest Pain       HPI: 43-year-old female arrives complaining that she has been getting exertionally short of breath she has had some pain that feels like electrical shocks over her chest that are very brief she gets 6-7 of these a day and she has had that for the last 3 to 4 days.  It does not sound cardiac in nature her EKG read by me reviewed by me is normal sinus rhythm with a left axis deviation left ventricular hypertrophy there is poor R wave progression and what appears to be lateral wall ischemia although this is a significantly abnormal EKG it is the EKG she has had multiple times as I have compared the past EKGs.  She is also tachycardic at 102 bpm respirations are 22 sat of 98% temp 36 2 and /82.  Also has complained of a headache for the last 21 days some posterior neck pain but she shows no nuchal rigidity.  Her labs have been reviewed.  She is significantly hyperglycemic with a glucose on her venous gas in the high 480s and her glucose on her serum was over 500.  She was started on a liter of normal saline and also noted to have a lactate of 3.2 this was significantly concerning but after IV fluids her heart rate has improved significantly her blood pressure improved and she also received 12 units of regular insulin subcutaneously.  Very high concern for this patient and that she had just been at [facility] a chest x-ray was done yesterday with no acute disease noted on the chest x-ray after my chart review.  She does have a CT of her head pending.  And her pregnancy test is negative.  She seems to be resting comfortably at this time but she needs help with her blood pressure medication and her diabetes including education and discussion about being compliant with her medications.  I have reviewed her chart there are multiple emergency visits and frequently she needs to be tuned up and stabilized for her significant diabetes.  She  does not take her metformin because of abdominal pain as well.      PMH: Hypertension diabetes    SH Tobacco quit alcohol years ago  FH goal has heart disease and diabetes  ROS  General Appears in distress  HEENT: No sore throat, No Visual Loss, No Headache, No Ear Pain  Neck: Denies neck pain  Chest: Positive as above chest pain, no pleuritic pain, no chest wall injury  Pulmonary: Positive exertional SOB, No Cough, No Sputum production, No Wheezing  GI: No abdominal pain, no nausea or vomiting, no diarrhea.  : No dysuria, no frequency, no hematuria.  Extremities: No musculoskeletal pain, normal ambulation, no paresthesia.  Psych: Normal interaction, no anxiety, no depression, no suicidal ideation  Skin: No rashes    ROS is otherwise negative    PE: General: Appears in distress        HEENT: Throat is moist without exudate, midline uvula dentate intact, Tms clear with normal anatomy.        Neck: Supple non tender        Chest CTA, good AE, no wheezing, rales, or rhonci positive chest wall tenderness over her area of pain.        CVA: RRR S1S2 no S3S4 or murmur        ABD: W/S/NT no HSM, no pulsatile masses, good bowel sounds        Extremities: Excellent distal pulses, brisk capillary refill. Full ROM        Psych: Normal interactions with no signs of depression  or suicidal ideation.        Neuro: Alert and oriented, moves all and feels all.    MDM:  43-year-old female arrives complaining that she has been getting exertionally short of breath she has had some pain that feels like electrical shocks over her chest that are very brief she gets 6-7 of these a day and she has had that for the last 3 to 4 days.  It does not sound cardiac in nature her EKG read by me reviewed by me is normal sinus rhythm with a left axis deviation left ventricular hypertrophy there is poor R wave progression and what appears to be lateral wall ischemia although this is a significantly abnormal EKG it is the EKG she has had multiple  times as I have compared the past EKGs.  She is also tachycardic at 102 bpm respirations are 22 sat of 98% temp 36 2 and /82.  Also has complained of a headache for the last 21 days some posterior neck pain but she shows no nuchal rigidity.  Her labs have been reviewed.  She is significantly hyperglycemic with a glucose on her venous gas in the high 480s and her glucose on her serum was over 500.  She was started on a liter of normal saline and also noted to have a lactate of 3.2 this was significantly concerning but after IV fluids her heart rate has improved significantly her blood pressure improved and she also received 12 units of regular insulin subcutaneously.  Very high concern for this patient and that she had just been at [facility] a chest x-ray was done yesterday with no acute disease noted on the chest x-ray after my chart review.  She does have a CT of her head pending.  And her pregnancy test is negative.  She seems to be resting comfortably at this time but she needs help with her blood pressure medication and her diabetes including education and discussion about being compliant with her medications.  I have reviewed her chart there are multiple emergency visits and frequently she needs to be tuned up and stabilized for her significant diabetes.  She does not take her metformin because of abdominal pain as well.  Patient will be admitted to observation I have discussed this with both the hospitalist and [facility] team    Sugar at 1719 has dropped from the mid 500s to 261 she is being offered food at this time.  In addition I have looked at her CAT scan I do not see an acute process but I am still waiting for an official radiology read as noted above.                          [place] Coma Scale Score: 15                  Patient History   Past Medical History:   Diagnosis Date    CHF (congestive heart failure) (CMS/[facility])     COPD (chronic obstructive pulmonary disease) (CMS/[facility])      Diabetes mellitus, type 2 (CMS/HCC)      Past Surgical History:   Procedure Laterality Date    CARDIAC DEFIBRILLATOR PLACEMENT       No family history on file.  Social History     Tobacco Use    Smoking status: Never    Smokeless tobacco: Never   Substance Use Topics    Alcohol use: Not on file    Drug use: Yes     Types: Marijuana       Physical Exam   ED Triage Vitals [12/31/23 1246]   Temp Heart Rate Resp BP   36.2 °C (97.1 °F) 102 22 127/68      SpO2 Temp Source Heart Rate Source Patient Position   98 % Temporal -- --      BP Location FiO2 (%)     -- --       Physical Exam    ED Course & MDM   Diagnoses as of 12/31/23 1630   Type 2 diabetes mellitus with hyperosmolar nonketotic hyperglycemia (CMS/HCC)   Dehydration       Medical Decision Making      Procedure  Procedures     Quinton Dumas MD  12/31/23 1720

## 2023-12-31 NOTE — H&P
"History Of Present Illness  Jose Abreu is a 43 y.o. female with PMH of morbid obesity, combined chronic systolic/diastolic heart failure/NICM (LVEF 25% 12/2021) s/p primary prevention ICD (12/17/21), diabetes mellitus, HTN, history of COVID-PNA who presented with multiple complaints.  She states that she has had chest pain like \"low voltage electric shocks\" that radiates down her left arm to fingertips.  This chest pain occurs at rest and lasts only a few minutes.  No correlation to activity. This has been occurring over the past 4-5 days.  She also feels like her heart is racing from time to time.  She endorses shortness of breath with exertion, such as going up the stairs.  She always sleeps propped on pillows, and did note swelling in her legs recently which had resolved. The last few days she has also felt nausea without vomiting.  She also notes a headache that has been ongoing for 21 days. She was seen in [facility] ED yesterday for the above complaints. At that visit she was hyperglycemic with blood sugar in 400s.  She states she was given insulin and IV fluids and sent home. She returned to our ED because she felt like her issues were not addressed or resolved. She reports compliance with her insulin, stating she takes 12 units lantus daily; was taken off metformin and trulicity due to GI side effects.      In [facility] ED, blood glucose was 485 on arrival.  She did not have an acidosis as pH was 7.43. Initial troponin was 15. BNP 45. Sodium 128, corrected to 134 with the hyperglycemia.      Past Medical History  Past Medical History:   Diagnosis Date    CHF (congestive heart failure) (CMS/[facility])     COPD (chronic obstructive pulmonary disease) (CMS/[facility])     Diabetes mellitus, type 2 (CMS/[facility])        Surgical History  Past Surgical History:   Procedure Laterality Date    CARDIAC DEFIBRILLATOR PLACEMENT          Social History  She reports that she has never smoked. She has never used smokeless tobacco. She reports " current drug use. Drug: Marijuana. No history on file for alcohol use.    Family History  No family history on file.     Allergies  Lisinopril and Tomato    Review of Systems     Physical Exam  Vitals reviewed.   Constitutional:       General: She is not in acute distress.     Appearance: Normal appearance. She is morbidly obese. She is not ill-appearing, toxic-appearing or diaphoretic.   HENT:      Head: Normocephalic.      Nose: Nose normal.      Mouth/Throat:      Mouth: Mucous membranes are moist.      Pharynx: Oropharynx is clear.   Eyes:      General: Lids are normal. Gaze aligned appropriately. No scleral icterus.     Extraocular Movements: Extraocular movements intact.      Conjunctiva/sclera: Conjunctivae normal.      Pupils: Pupils are equal, round, and reactive to light.   Cardiovascular:      Rate and Rhythm: Normal rate and regular rhythm.      Pulses: Normal pulses.      Heart sounds: Normal heart sounds. No murmur heard.  Pulmonary:      Effort: Pulmonary effort is normal.      Breath sounds: Normal breath sounds and air entry.   Abdominal:      General: Abdomen is flat. Bowel sounds are normal.      Palpations: Abdomen is soft.      Tenderness: There is no abdominal tenderness.   Musculoskeletal:         General: Normal range of motion.      Cervical back: Full passive range of motion without pain and normal range of motion.      Right lower leg: No edema.      Left lower leg: No edema.   Skin:     General: Skin is warm and dry.      Capillary Refill: Capillary refill takes less than 2 seconds.   Neurological:      General: No focal deficit present.      Mental Status: She is alert and oriented to person, place, and time.      Motor: Motor function is intact.      Coordination: Coordination is intact.   Psychiatric:         Attention and Perception: Attention normal.         Mood and Affect: Mood normal.         Speech: Speech normal.         Behavior: Behavior normal. Behavior is cooperative.         "  Last Recorded Vitals  Blood pressure 139/87, pulse 91, temperature 36.2 °C (97.1 °F), temperature source Temporal, resp. rate 16, height 1.626 m (5' 4\"), weight 120 kg (265 lb), SpO2 99 %.    Relevant Results      Scheduled medications  ascorbic acid, 250 mg, oral, Daily  atorvastatin, 80 mg, oral, Nightly  carvedilol, 25 mg, oral, BID with meals  enoxaparin, 40 mg, subcutaneous, q12h MOISES  ezetimibe, 10 mg, oral, Nightly  ferrous sulfate (325 mg ferrous sulfate), 65 mg of iron, oral, Daily  glipiZIDE, 10 mg, oral, Daily with evening meal  insulin glargine, 15 Units, subcutaneous, Nightly  insulin lispro, 0-10 Units, subcutaneous, TID with meals  melatonin, 3 mg, oral, Nightly  polyethylene glycol, 17 g, oral, Daily  sacubitriL-valsartan, 1 tablet, oral, BID  [Held by provider] spironolactone, 25 mg, oral, Daily  [Held by provider] torsemide, 40 mg, oral, Daily      Continuous medications     PRN medications  PRN medications: acetaminophen **OR** acetaminophen **OR** acetaminophen, albuterol, dextrose 10 % in water (D10W), dextrose, glucagon, ondansetron **OR** ondansetron    Results for orders placed or performed during the hospital encounter of 12/31/23 (from the past 24 hour(s))   CBC with Differential   Result Value Ref Range    WBC 8.6 4.4 - 11.3 x10*3/uL    nRBC 0.0 0.0 - 0.0 /100 WBCs    RBC 5.64 (H) 4.00 - 5.20 x10*6/uL    Hemoglobin 13.0 12.0 - 16.0 g/dL    Hematocrit 42.1 36.0 - 46.0 %    MCV 75 (L) 80 - 100 fL    MCH 23.0 (L) 26.0 - 34.0 pg    MCHC 30.9 (L) 32.0 - 36.0 g/dL    RDW 13.7 11.5 - 14.5 %    Platelets 236 150 - 450 x10*3/uL    Neutrophils % 54.9 40.0 - 80.0 %    Immature Granulocytes %, Automated 0.2 0.0 - 0.9 %    Lymphocytes % 38.6 13.0 - 44.0 %    Monocytes % 5.0 2.0 - 10.0 %    Eosinophils % 1.0 0.0 - 6.0 %    Basophils % 0.3 0.0 - 2.0 %    Neutrophils Absolute 4.71 1.20 - 7.70 x10*3/uL    Immature Granulocytes Absolute, Automated 0.02 0.00 - 0.70 x10*3/uL    Lymphocytes Absolute 3.32 1.20 " - 4.80 x10*3/uL    Monocytes Absolute 0.43 0.10 - 1.00 x10*3/uL    Eosinophils Absolute 0.09 0.00 - 0.70 x10*3/uL    Basophils Absolute 0.03 0.00 - 0.10 x10*3/uL   Comprehensive Metabolic Panel   Result Value Ref Range    Glucose 485 (HH) 74 - 99 mg/dL    Sodium 128 (L) 136 - 145 mmol/L    Potassium 4.5 3.5 - 5.3 mmol/L    Chloride 92 (L) 98 - 107 mmol/L    Bicarbonate 26 21 - 32 mmol/L    Anion Gap 15 10 - 20 mmol/L    Urea Nitrogen 12 6 - 23 mg/dL    Creatinine 0.81 0.50 - 1.05 mg/dL    eGFR >90 >60 mL/min/1.73m*2    Calcium 9.2 8.6 - 10.3 mg/dL    Albumin 3.7 3.4 - 5.0 g/dL    Alkaline Phosphatase 94 33 - 110 U/L    Total Protein 7.0 6.4 - 8.2 g/dL    AST 11 9 - 39 U/L    Bilirubin, Total 0.4 0.0 - 1.2 mg/dL    ALT 10 7 - 45 U/L   Troponin I, High Sensitivity   Result Value Ref Range    Troponin I, High Sensitivity 15 (H) 0 - 13 ng/L   Sars-CoV-2 and Influenza A/B PCR   Result Value Ref Range    Flu A Result Not Detected Not Detected    Flu B Result Not Detected Not Detected    Coronavirus 2019, PCR Not Detected Not Detected   B-type natriuretic peptide   Result Value Ref Range    BNP 45 0 - 99 pg/mL   D-dimer, VTE Exclusion   Result Value Ref Range    D-Dimer, Quantitative VTE Exclusion 224 <=500 ng/mL FEU   Blood Gas Venous Full Panel   Result Value Ref Range    POCT pH, Venous 7.43 7.33 - 7.43 pH    POCT pCO2, Venous 45 41 - 51 mm Hg    POCT pO2, Venous 58 (H) 35 - 45 mm Hg    POCT SO2, Venous 89 (H) 45 - 75 %    POCT Oxy Hemoglobin, Venous 87.0 (H) 45.0 - 75.0 %    POCT Hematocrit Calculated, Venous 38.0 36.0 - 46.0 %    POCT Sodium, Venous 127 (L) 136 - 145 mmol/L    POCT Potassium, Venous 4.4 3.5 - 5.3 mmol/L    POCT Chloride, Venous 94 (L) 98 - 107 mmol/L    POCT Ionized Calicum, Venous 1.21 1.10 - 1.33 mmol/L    POCT Glucose, Venous 554 (HH) 74 - 99 mg/dL    POCT Lactate, Venous 3.6 (H) 0.4 - 2.0 mmol/L    POCT Base Excess, Venous 4.8 (H) -2.0 - 3.0 mmol/L    POCT HCO3 Calculated, Venous 29.9 (H) 22.0 -  26.0 mmol/L    POCT Hemoglobin, Venous 12.7 12.0 - 16.0 g/dL    POCT Anion Gap, Venous 8.0 (L) 10.0 - 25.0 mmol/L    Patient Temperature 37.0 degrees Celsius    FiO2 21 %   Ethanol   Result Value Ref Range    Alcohol <10 <=10 mg/dL   Human Chorionic Gonadotropin, Serum Quantitative   Result Value Ref Range    HCG, Beta-Quantitative <2 <5 mIU/mL   Blood Gas Lactic Acid, Venous   Result Value Ref Range    POCT Lactate, Venous 3.2 (H) 0.4 - 2.0 mmol/L   POCT GLUCOSE   Result Value Ref Range    POCT Glucose 261 (H) 74 - 99 mg/dL   POCT GLUCOSE   Result Value Ref Range    POCT Glucose 285 (H) 74 - 99 mg/dL       CT head wo IV contrast    Result Date: 12/31/2023  Interpreted By:  Reg Miller, STUDY: CT HEAD WO IV CONTRAST;  12/31/2023 5:06 pm   INDICATION: Signs/Symptoms:headache for 21 days.   COMPARISON: 06/21/2021 CT head.   ACCESSION NUMBER(S): [id number]   ORDERING CLINICIAN: ELISE CAPONE   TECHNIQUE: Noncontrast axial CT images of head were obtained with coronal and sagittal reconstructed images.   FINDINGS: BRAIN PARENCHYMA:  No acute intraparenchymal hemorrhage or parenchymal evidence of acute large territory ischemic infarct. No mass-effect. Gray-white matter distinction is preserved.   VENTRICLES and EXTRA-AXIAL SPACES:  No acute extra-axial or intraventricular hemorrhage. No effacement of cerebral sulci. Ventricles and sulci are age-concordant.   PARANASAL SINUSES/MASTOIDS:  No hemorrhage or air-fluid levels within the visualized paranasal sinuses. The mastoids are well aerated.   CALVARIUM/ORBITS:  No skull fracture.  The orbits and globes are intact to the extent visualized.   EXTRACRANIAL SOFT TISSUES: No discernible abnormality.       1. No acute intracranial abnormality.         MACRO: None.   Signed by: Reg Miller 12/31/2023 5:23 PM Dictation workstation:   [id number]    XR CHEST 1V FRONTAL PORT    Result Date: 12/30/2023  * * *Final Report* * * DATE OF EXAM: Dec 30 2023 11:13PM   EGX    "[id number]  -  XR CHEST 1V FRONTAL PORT  / ACCESSION #  [id number] PROCEDURE REASON: Shortness of breath      * * * * Physician Interpretation * * * *  EXAMINATION:  CHEST RADIOGRAPH (PORTABLE SINGLE VIEW AP) Exam Date/Time:  12/30/2023 11:13 PM CLINICAL HISTORY: Shortness of breath MQ:  XCPR_5 Comparison:  CT chest 06/30/2023, chest radiograph 12/14/2022 RESULT: Lines, tubes, and devices:  Stable position of left chest wall subcutaneous ICD. Lungs and pleura:  No focal consolidation.  No pleural effusion or pneumothorax. Cardiomediastinal silhouette:  Stable cardiomediastinal silhouette. Other:  .    IMPRESSION: No acute radiographic abnormality. : PSCB   Transcribe Date/Time: Dec 30 2023 11:25P Dictated by : KEVIN PEREZ MD This examination was interpreted and the report reviewed and electronically signed by: HERMAN KENDRICK MD on Dec 30 2023 11:35PM  EST        Assessment/Plan   Principal Problem:    Hyperosmolar non-ketotic state in patient with type 2 diabetes mellitus (CMS/HCC)  Active Problems:    Type 2 diabetes mellitus with hyperosmolar nonketotic hyperglycemia (CMS/HCC)    Dehydration    Jose Abreu is a 43 y.o. female with PMH of morbid obesity, combined chronic systolic/diastolic heart failure/NICM (LVEF 25% 12/2021) s/p primary prevention ICD (12/17/21), diabetes mellitus, HTN, history of COVID-PNA who presented with multiple complaints.  She states that she has had chest pain like \"low voltage electric shocks\" that radiates down her left arm to fingertips.  This chest pain occurs at rest and lasts only a few minutes.  No correlation to activity. This has been occurring over the past 4-5 days.  She also feels like her heart is racing from time to time.  She endorses shortness of breath with exertion, such as going up the stairs.  She always sleeps propped on pillows, and did note swelling in her legs recently which had resolved. The last few days she has also felt nausea without " vomiting.  She also notes a headache that has been ongoing for 21 days. She was seen in [facility] ED yesterday for the above complaints. At that visit she was hyperglycemic with blood sugar in 400s.  She states she was given insulin and IV fluids and sent home. She returned to our ED because she felt like her issues were not addressed or resolved. She reports compliance with her insulin, stating she takes 12 units lantus daily; was taken off metformin and trulicity due to GI side effects.      In [facility] ED, blood glucose was 485 on arrival.  She did not have an acidosis as pH was 7.43. Initial troponin was 15. BNP 45. Sodium 128, corrected to 134 with the hyperglycemia. Lactic elevated     Chest pain   Combined systolic/diastolic CHF, NICM s/p ICD placement   - appreciate cardiology consultation   - ICD device check--> unable to order as unable to find device details   - cardiac monitoring   - first troponin 15, repeat pending   - D Dimer WNL   - continue entresto, carvedilol.    - torsemide/spironolactone on hold as patient appears dry on exam     Poorly controlled diabetes mellitus   - blood sugars improved after insulin administration   - lantus 15 units (appears to have recently been increased at recent endocrine visit, although patient reports still doing 12 units).  Monitor and titrate as necessary   - continue home glipizide   - sliding scale insulin, hypoglycemic protocol, diabetic diet  - A1c pending   - diabetic educator/dietitian consult  - consider endrinocology consult (noted no in house endocrine coverage 12/31 or 1/1) versus close outpatient follow up     HTN   - stable, continue current meds     VTE/GI Prophylaxis   - subcutaneous lovenox   - bowel regimen in place     Discharge Planning   - plan to discharge home when medically stable   - patient has expressed interest in transferring care to  system and would need referrals         I spent 60 minutes in the professional and overall care of this  patient.      Lashawn oClin, APRN-CNP

## 2024-01-01 VITALS
RESPIRATION RATE: 18 BRPM | WEIGHT: 265 LBS | TEMPERATURE: 97.3 F | OXYGEN SATURATION: 100 % | HEIGHT: 64 IN | BODY MASS INDEX: 45.24 KG/M2 | SYSTOLIC BLOOD PRESSURE: 98 MMHG | HEART RATE: 89 BPM | DIASTOLIC BLOOD PRESSURE: 69 MMHG

## 2024-01-01 LAB
ANION GAP SERPL CALC-SCNC: 12 MMOL/L (ref 10–20)
APPEARANCE UR: CLEAR
BILIRUB UR STRIP.AUTO-MCNC: NEGATIVE MG/DL
BUN SERPL-MCNC: 13 MG/DL (ref 6–23)
CALCIUM SERPL-MCNC: 8.4 MG/DL (ref 8.6–10.3)
CARDIAC TROPONIN I PNL SERPL HS: 13 NG/L (ref 0–13)
CHLORIDE SERPL-SCNC: 100 MMOL/L (ref 98–107)
CO2 SERPL-SCNC: 24 MMOL/L (ref 21–32)
COLOR UR: YELLOW
CREAT SERPL-MCNC: 0.6 MG/DL (ref 0.5–1.05)
ERYTHROCYTE [DISTWIDTH] IN BLOOD BY AUTOMATED COUNT: 13.8 % (ref 11.5–14.5)
GFR SERPL CREATININE-BSD FRML MDRD: >90 ML/MIN/1.73M*2
GLUCOSE BLD MANUAL STRIP-MCNC: 227 MG/DL (ref 74–99)
GLUCOSE BLD MANUAL STRIP-MCNC: 353 MG/DL (ref 74–99)
GLUCOSE SERPL-MCNC: 274 MG/DL (ref 74–99)
GLUCOSE UR STRIP.AUTO-MCNC: ABNORMAL MG/DL
HCT VFR BLD AUTO: 40.4 % (ref 36–46)
HGB BLD-MCNC: 12.4 G/DL (ref 12–16)
KETONES UR STRIP.AUTO-MCNC: ABNORMAL MG/DL
LACTATE SERPL-SCNC: 1 MMOL/L (ref 0.4–2)
LEUKOCYTE ESTERASE UR QL STRIP.AUTO: ABNORMAL
MCH RBC QN AUTO: 23.1 PG (ref 26–34)
MCHC RBC AUTO-ENTMCNC: 30.7 G/DL (ref 32–36)
MCV RBC AUTO: 75 FL (ref 80–100)
NITRITE UR QL STRIP.AUTO: NEGATIVE
NRBC BLD-RTO: 0 /100 WBCS (ref 0–0)
PH UR STRIP.AUTO: 8 [PH]
PLATELET # BLD AUTO: 199 X10*3/UL (ref 150–450)
POTASSIUM SERPL-SCNC: 3.9 MMOL/L (ref 3.5–5.3)
PROT UR STRIP.AUTO-MCNC: NEGATIVE MG/DL
RBC # BLD AUTO: 5.36 X10*6/UL (ref 4–5.2)
RBC # UR STRIP.AUTO: NEGATIVE /UL
RBC #/AREA URNS AUTO: NORMAL /HPF
SODIUM SERPL-SCNC: 132 MMOL/L (ref 136–145)
SP GR UR STRIP.AUTO: 1.04
SQUAMOUS #/AREA URNS AUTO: NORMAL /HPF
UROBILINOGEN UR STRIP.AUTO-MCNC: <2 MG/DL
WBC # BLD AUTO: 8.3 X10*3/UL (ref 4.4–11.3)
WBC #/AREA URNS AUTO: NORMAL /HPF

## 2024-01-01 PROCEDURE — 2500000001 HC RX 250 WO HCPCS SELF ADMINISTERED DRUGS (ALT 637 FOR MEDICARE OP): Performed by: NURSE PRACTITIONER

## 2024-01-01 PROCEDURE — 80048 BASIC METABOLIC PNL TOTAL CA: CPT | Performed by: NURSE PRACTITIONER

## 2024-01-01 PROCEDURE — 83605 ASSAY OF LACTIC ACID: CPT | Performed by: NURSE PRACTITIONER

## 2024-01-01 PROCEDURE — 99231 SBSQ HOSP IP/OBS SF/LOW 25: CPT | Performed by: NURSE PRACTITIONER

## 2024-01-01 PROCEDURE — 2500000002 HC RX 250 W HCPCS SELF ADMINISTERED DRUGS (ALT 637 FOR MEDICARE OP, ALT 636 FOR OP/ED): Performed by: NURSE PRACTITIONER

## 2024-01-01 PROCEDURE — 84484 ASSAY OF TROPONIN QUANT: CPT | Performed by: NURSE PRACTITIONER

## 2024-01-01 PROCEDURE — 87086 URINE CULTURE/COLONY COUNT: CPT | Mod: AHULAB | Performed by: NURSE PRACTITIONER

## 2024-01-01 PROCEDURE — 80051 ELECTROLYTE PANEL: CPT | Performed by: NURSE PRACTITIONER

## 2024-01-01 PROCEDURE — G0378 HOSPITAL OBSERVATION PER HR: HCPCS

## 2024-01-01 PROCEDURE — 81001 URINALYSIS AUTO W/SCOPE: CPT | Performed by: NURSE PRACTITIONER

## 2024-01-01 PROCEDURE — 82947 ASSAY GLUCOSE BLOOD QUANT: CPT | Mod: 91

## 2024-01-01 PROCEDURE — 85027 COMPLETE CBC AUTOMATED: CPT | Performed by: NURSE PRACTITIONER

## 2024-01-01 PROCEDURE — 96372 THER/PROPH/DIAG INJ SC/IM: CPT | Performed by: NURSE PRACTITIONER

## 2024-01-01 PROCEDURE — 2500000004 HC RX 250 GENERAL PHARMACY W/ HCPCS (ALT 636 FOR OP/ED): Performed by: NURSE PRACTITIONER

## 2024-01-01 PROCEDURE — 36415 COLL VENOUS BLD VENIPUNCTURE: CPT | Performed by: NURSE PRACTITIONER

## 2024-01-01 RX ADMIN — CARVEDILOL 25 MG: 25 TABLET, FILM COATED ORAL at 09:20

## 2024-01-01 RX ADMIN — FERROUS SULFATE TAB 325 MG (65 MG ELEMENTAL FE) 1 TABLET: 325 (65 FE) TAB at 09:20

## 2024-01-01 RX ADMIN — OXYCODONE HYDROCHLORIDE AND ACETAMINOPHEN 250 MG: 500 TABLET ORAL at 09:20

## 2024-01-01 RX ADMIN — INSULIN LISPRO 10 UNITS: 100 INJECTION, SOLUTION INTRAVENOUS; SUBCUTANEOUS at 09:20

## 2024-01-01 RX ADMIN — INSULIN LISPRO 4 UNITS: 100 INJECTION, SOLUTION INTRAVENOUS; SUBCUTANEOUS at 12:21

## 2024-01-01 RX ADMIN — SACUBITRIL AND VALSARTAN 1 TABLET: 49; 51 TABLET, FILM COATED ORAL at 09:20

## 2024-01-01 RX ADMIN — ENOXAPARIN SODIUM 40 MG: 40 INJECTION SUBCUTANEOUS at 09:20

## 2024-01-01 ASSESSMENT — PAIN SCALES - GENERAL
PAINLEVEL_OUTOF10: 0 - NO PAIN

## 2024-01-01 ASSESSMENT — PAIN - FUNCTIONAL ASSESSMENT
PAIN_FUNCTIONAL_ASSESSMENT: 0-10

## 2024-01-01 ASSESSMENT — PAIN DESCRIPTION - DESCRIPTORS: DESCRIPTORS: HEADACHE;ACHING

## 2024-01-01 NOTE — DISCHARGE SUMMARY
Discharge Diagnosis  Hyperosmolar non-ketotic state in patient with type 2 diabetes mellitus (CMS/[facility])    Issues Requiring Follow-Up  Uncontrolled diabetes/tachycardia with palpitations    Discharge Meds     Your medication list        CONTINUE taking these medications        Instructions Last Dose Given Next Dose Due   albuterol sulfate 90 mcg/actuation aero powdr breath act w/sensor inhaler  Commonly known as: Proair Digihaler           ascorbic acid 500 mg tablet  Commonly known as: Vitamin C           atorvastatin 80 mg tablet  Commonly known as: Lipitor           carvedilol 25 mg tablet  Commonly known as: Coreg           Entresto 49-51 mg tablet  Generic drug: sacubitriL-valsartan           ezetimibe 10 mg tablet  Commonly known as: Zetia           ferrous sulfate 325 (65 Fe) MG EC tablet           glipiZIDE 10 mg tablet  Commonly known as: Glucotrol           Lantus U-100 Insulin 100 unit/mL injection  Generic drug: insulin glargine           ondansetron 4 mg tablet  Commonly known as: Zofran           spironolactone 25 mg tablet  Commonly known as: Aldactone           torsemide 20 mg tablet  Commonly known as: Demadex                    Test Results Pending At Discharge  Pending Labs       No current pending labs.            Hospital Course   Chest pain   Combined systolic/diastolic CHF, NICM s/p ICD placement   - appreciate cardiology consultation   - ICD device check--> unable to order as unable to find device details   - cardiac monitoring   - first troponin 15, repeat pending   - D Dimer WNL   - continue entresto, carvedilol.    - torsemide/spironolactone on hold as patient appears dry on exam      Poorly controlled diabetes mellitus   - blood sugars improved after insulin administration   - lantus 15 units (appears to have recently been increased at recent endocrine visit, although patient reports still doing 12 units).  Monitor and titrate as necessary   - continue home glipizide   - sliding scale  insulin, hypoglycemic protocol, diabetic diet  - A1c 14.3  - diabetic educator/dietitian consult out patient through endocrinologist  - consider endrinocology consult (noted no in house endocrine coverage 12/31 or 1/1) versus close outpatient follow up      HTN   - stable, continue current meds      VTE/GI Prophylaxis   - subcutaneous lovenox   - bowel regimen in place      Discharge Planning   - plan to discharge home when medically stable   - patient has expressed interest in transferring care to  system and would need referrals     Labs/Testing reviewed    Interdisciplinary team rounding completed with hospitalist, nurse, TCC    NP discussed plan and lab/testing results with Dr. Turner    Cardiology signed off on patient and feels her symptoms were due to elevated BS since symptoms are now subsided with decrease in blood glucose  Patient will see her endocrinologist and have them make an appointment with dietician for diabetic teaching due to Hgb A1c being 14.3  She will need to call her pcp for follow up within a week due to not having seen her in a long time    * 45 minutes total spent on patient's care today; >50% time spent on counseling/coordination of care    Pertinent Physical Exam At Time of Discharge  Physical Exam  General: Vital signs stable, Pt is alert, no acute distress  Eyes: Conjunctiva normal, PERRL, EOMs intact  HENMT: Normocephalic, atraumatic, external ears and nose normal, no scars or masses.  No mastoid tenderness. Trachea is midline. No meningeal signs, negative Kernig and Brudzinski, moves neck freely.  No sinus tenderness  Resp: Respiratory effort is normal, no retractions, no stridor. Lungs CTA, no wheezes or rhonchi  CV: Heart is regular rate and rhythm.   Skin: No evidence of trauma, skin is warm and dry. No rashes, lesions or ulcers.  Skel: full range of motion of upper and lower extremities.   Neuro: Normal gait, CN II-XII intact, no motor or sensory changes.  Psych: Alert and  oriented ×3, judgment is appropriate, normal mood and affect     Outpatient Follow-Up  No future appointments.      Manisha Graham, APRN-CNP

## 2024-01-01 NOTE — CARE PLAN
Problem: Pain - Adult  Goal: Verbalizes/displays adequate comfort level or baseline comfort level  Outcome: Progressing   The patient's goals for the shift include rest; safety    The clinical goals for the shift include monitor vs, labs, I&O's; manage pain; UA; rest

## 2024-01-01 NOTE — CARE PLAN
Problem: Pain - Adult  Goal: Verbalizes/displays adequate comfort level or baseline comfort level  Outcome: Progressing     Problem: Safety - Adult  Goal: Free from fall injury  Outcome: Progressing     Problem: Discharge Planning  Goal: Discharge to home or other facility with appropriate resources  Outcome: Progressing     Problem: Chronic Conditions and Co-morbidities  Goal: Patient's chronic conditions and co-morbidity symptoms are monitored and maintained or improved  Outcome: Progressing     Problem: Pain  Goal: Takes deep breaths with improved pain control throughout the shift  Outcome: Progressing  Goal: Turns in bed with improved pain control throughout the shift  Outcome: Progressing  Goal: Walks with improved pain control throughout the shift  Outcome: Progressing  Goal: Performs ADL's with improved pain control throughout shift  Outcome: Progressing  Goal: Participates in PT with improved pain control throughout the shift  Outcome: Progressing  Goal: Free from opioid side effects throughout the shift  Outcome: Progressing  Goal: Free from acute confusion related to pain meds throughout the shift  Outcome: Progressing   The patient's goals for the shift include  to get at least 4 hours of sleep this shift    The clinical goals for the shift include Pt will have BS in am <200    Over the shift, the patient did make progress toward the following goals. GOAL MET

## 2024-01-01 NOTE — DISCHARGE INSTRUCTIONS
YOU NEED TO SET UP AN APPOINTMENT WITH YOUR ENDOCRINOLOGIST YOUR HEMAGLOBIN A1C WAS 14.3 WHICH IS MORE THEN DOUBLE WHAT IT IS SUPPOSE TO BE  CONTINUE ON YOUR DIABETIC MEDICATIONS WATCHING YOUR SUGAR INTAKE AND CARBOHYDRATES  CONTINUE CHECKING BLOOD SUGARS THEY SHOULD BE BELOW 200 IDEALLY BELOW 150  YOUR ENDOCRINOLOGIST MAY WANT TO INCREASE YOUR INSULIN DOSING  CALL YOUR PCP DR CURIEL TO MAKE AN APPOINTMENT TO GET A FULL PHYSICAL   CARDIOLOGY CLEARED YOU OF HEART ISSUES FOR NOW THEY FEEL LIKE YOUR SYMPTOMS WERE FROM ELEVATED BLOOD SUGARS

## 2024-01-01 NOTE — NURSING NOTE
Patient arrived to unit from ED. Patient had been experiencing chest pain, weakness, and SOB at home.  BS upon arrival was 285. Patient denies any chest pain at this time.

## 2024-01-03 LAB — BACTERIA UR CULT: NORMAL

## 2024-01-10 LAB
ATRIAL RATE: 99 BPM
P AXIS: 87 DEGREES
P OFFSET: 202 MS
P ONSET: 155 MS
PR INTERVAL: 122 MS
Q ONSET: 216 MS
QRS COUNT: 16 BEATS
QRS DURATION: 104 MS
QT INTERVAL: 340 MS
QTC CALCULATION(BAZETT): 436 MS
QTC FREDERICIA: 401 MS
R AXIS: -18 DEGREES
T AXIS: 166 DEGREES
T OFFSET: 386 MS
VENTRICULAR RATE: 99 BPM